# Patient Record
Sex: MALE | Race: WHITE | URBAN - METROPOLITAN AREA
[De-identification: names, ages, dates, MRNs, and addresses within clinical notes are randomized per-mention and may not be internally consistent; named-entity substitution may affect disease eponyms.]

---

## 2022-07-19 ENCOUNTER — OFFICE VISIT (OUTPATIENT)
Dept: URBAN - METROPOLITAN AREA CLINIC 17 | Facility: CLINIC | Age: 57
End: 2022-07-19
Payer: COMMERCIAL

## 2022-07-19 DIAGNOSIS — H25.13 AGE-RELATED NUCLEAR CATARACT, BILATERAL: ICD-10-CM

## 2022-07-19 DIAGNOSIS — H17.89 OTHER CORNEAL OPACITIES: ICD-10-CM

## 2022-07-19 DIAGNOSIS — H11.153 PINGUECULA, BILATERAL: ICD-10-CM

## 2022-07-19 PROCEDURE — 99204 OFFICE O/P NEW MOD 45 MIN: CPT | Performed by: OPTOMETRIST

## 2022-07-19 RX ORDER — PREDNISOLONE ACETATE 10 MG/ML
1 % SUSPENSION/ DROPS OPHTHALMIC
Qty: 10 | Refills: 1 | Status: ACTIVE
Start: 2022-07-19

## 2022-07-19 ASSESSMENT — INTRAOCULAR PRESSURE
OS: 15
OD: 16

## 2022-07-19 NOTE — IMPRESSION/PLAN
Impression: Corneal edema: H18.20 Left. Plan: Discussed diagnosis in detail with patient. Reassured patient of current condition and treatment. Recommend patient to start Pred ace Q2H OS all day. Advised patient to wake up during the night to instill drops.   (eRx sent)

## 2022-07-19 NOTE — IMPRESSION/PLAN
Impression: Other corneal opacities: H17.89. S/P LASIK OU. Plan: No treatment is required at this time. Will continue to observe condition and or symptoms.

## 2022-07-21 ENCOUNTER — OFFICE VISIT (OUTPATIENT)
Dept: URBAN - METROPOLITAN AREA CLINIC 17 | Facility: CLINIC | Age: 57
End: 2022-07-21
Payer: COMMERCIAL

## 2022-07-21 DIAGNOSIS — H18.20 CORNEAL EDEMA: Primary | ICD-10-CM

## 2022-07-21 PROCEDURE — 99203 OFFICE O/P NEW LOW 30 MIN: CPT | Performed by: OPHTHALMOLOGY

## 2022-07-21 ASSESSMENT — INTRAOCULAR PRESSURE
OS: 15
OD: 14

## 2022-07-21 NOTE — IMPRESSION/PLAN
Impression: Corneal edema: H18.20 Left. Plan: Discussed diagnosis in detail with patient. Advised patient of condition. Recommend using Pred ace q2h throughout today and tomorrow then decrease to 6 times a day until seen.

## 2022-08-01 ENCOUNTER — OFFICE VISIT (OUTPATIENT)
Dept: URBAN - METROPOLITAN AREA CLINIC 17 | Facility: CLINIC | Age: 57
End: 2022-08-01
Payer: COMMERCIAL

## 2022-08-01 DIAGNOSIS — H18.20 CORNEAL EDEMA: Primary | ICD-10-CM

## 2022-08-01 PROCEDURE — 99212 OFFICE O/P EST SF 10 MIN: CPT | Performed by: OPHTHALMOLOGY

## 2022-08-01 RX ORDER — PREDNISOLONE ACETATE 10 MG/ML
1 % SUSPENSION/ DROPS OPHTHALMIC
Qty: 10 | Refills: 1 | Status: ACTIVE
Start: 2022-08-01

## 2022-08-01 ASSESSMENT — INTRAOCULAR PRESSURE
OD: 9
OS: 7

## 2022-08-01 NOTE — IMPRESSION/PLAN
Impression: Corneal edema: H18.20 Left. Plan: Discussed condition has improved, Vision has improved as well. Would recommend continuing pred ace drops BID OS x 2 weeks, if things feel good in 2 weeks he is okay to stop. If he feels condition is flaring up again call for appointment.